# Patient Record
Sex: MALE | Race: BLACK OR AFRICAN AMERICAN | NOT HISPANIC OR LATINO | URBAN - METROPOLITAN AREA
[De-identification: names, ages, dates, MRNs, and addresses within clinical notes are randomized per-mention and may not be internally consistent; named-entity substitution may affect disease eponyms.]

---

## 2024-03-03 PROBLEM — I10 HYPERTENSION: Status: ACTIVE | Noted: 2024-03-03

## 2024-03-03 PROBLEM — I48.91 A-FIB (MULTI): Status: ACTIVE | Noted: 2024-03-03

## 2024-03-03 PROBLEM — I48.92 ATRIAL FLUTTER (MULTI): Status: ACTIVE | Noted: 2024-03-03

## 2024-03-04 ENCOUNTER — HOSPITAL ENCOUNTER (OUTPATIENT)
Dept: CARDIOLOGY | Facility: CLINIC | Age: 34
Discharge: HOME | End: 2024-03-04
Payer: COMMERCIAL

## 2024-03-04 ENCOUNTER — OFFICE VISIT (OUTPATIENT)
Dept: CARDIOLOGY | Facility: CLINIC | Age: 34
End: 2024-03-04
Payer: COMMERCIAL

## 2024-03-04 VITALS
BODY MASS INDEX: 31.25 KG/M2 | OXYGEN SATURATION: 98 % | HEIGHT: 75 IN | SYSTOLIC BLOOD PRESSURE: 113 MMHG | HEART RATE: 83 BPM | DIASTOLIC BLOOD PRESSURE: 77 MMHG | WEIGHT: 251.3 LBS

## 2024-03-04 DIAGNOSIS — R00.2 PALPITATIONS: ICD-10-CM

## 2024-03-04 DIAGNOSIS — I48.92 ATRIAL FLUTTER, UNSPECIFIED TYPE (MULTI): ICD-10-CM

## 2024-03-04 DIAGNOSIS — I51.9 DECREASED LEFT VENTRICULAR SYSTOLIC FUNCTION: ICD-10-CM

## 2024-03-04 PROCEDURE — 93005 ELECTROCARDIOGRAM TRACING: CPT | Mod: 59 | Performed by: INTERNAL MEDICINE

## 2024-03-04 PROCEDURE — 3074F SYST BP LT 130 MM HG: CPT | Performed by: INTERNAL MEDICINE

## 2024-03-04 PROCEDURE — 1036F TOBACCO NON-USER: CPT | Performed by: INTERNAL MEDICINE

## 2024-03-04 PROCEDURE — 99214 OFFICE O/P EST MOD 30 MIN: CPT | Performed by: INTERNAL MEDICINE

## 2024-03-04 PROCEDURE — 93246 EXT ECG>7D<15D RECORDING: CPT

## 2024-03-04 PROCEDURE — 93248 EXT ECG>7D<15D REV&INTERPJ: CPT | Performed by: INTERNAL MEDICINE

## 2024-03-04 PROCEDURE — 3078F DIAST BP <80 MM HG: CPT | Performed by: INTERNAL MEDICINE

## 2024-03-04 PROCEDURE — 93010 ELECTROCARDIOGRAM REPORT: CPT | Performed by: INTERNAL MEDICINE

## 2024-03-04 PROCEDURE — 99204 OFFICE O/P NEW MOD 45 MIN: CPT | Performed by: INTERNAL MEDICINE

## 2024-03-04 ASSESSMENT — ENCOUNTER SYMPTOMS
DYSPNEA ON EXERTION: 1
DEPRESSION: 0
SHORTNESS OF BREATH: 1
ENDOCRINE NEGATIVE: 1
MUSCULOSKELETAL NEGATIVE: 1
PSYCHIATRIC NEGATIVE: 1
LOSS OF SENSATION IN FEET: 0
BLOATING: 1
NEUROLOGICAL NEGATIVE: 1
PALPITATIONS: 1
CONSTITUTIONAL NEGATIVE: 1
HEMATOLOGIC/LYMPHATIC NEGATIVE: 1
OCCASIONAL FEELINGS OF UNSTEADINESS: 1
IRREGULAR HEARTBEAT: 1
EYES NEGATIVE: 1

## 2024-03-04 ASSESSMENT — COLUMBIA-SUICIDE SEVERITY RATING SCALE - C-SSRS
1. IN THE PAST MONTH, HAVE YOU WISHED YOU WERE DEAD OR WISHED YOU COULD GO TO SLEEP AND NOT WAKE UP?: NO
6. HAVE YOU EVER DONE ANYTHING, STARTED TO DO ANYTHING, OR PREPARED TO DO ANYTHING TO END YOUR LIFE?: NO
2. HAVE YOU ACTUALLY HAD ANY THOUGHTS OF KILLING YOURSELF?: NO

## 2024-03-04 ASSESSMENT — PATIENT HEALTH QUESTIONNAIRE - PHQ9
2. FEELING DOWN, DEPRESSED OR HOPELESS: NOT AT ALL
2. FEELING DOWN, DEPRESSED OR HOPELESS: NOT AT ALL
1. LITTLE INTEREST OR PLEASURE IN DOING THINGS: NOT AT ALL
SUM OF ALL RESPONSES TO PHQ9 QUESTIONS 1 AND 2: 0
1. LITTLE INTEREST OR PLEASURE IN DOING THINGS: NOT AT ALL
SUM OF ALL RESPONSES TO PHQ9 QUESTIONS 1 AND 2: 0

## 2024-03-04 ASSESSMENT — PAIN SCALES - GENERAL: PAINLEVEL: 0-NO PAIN

## 2024-03-04 NOTE — PROGRESS NOTES
No current outpatient medications     Subjective   Gibran Mckoy is a 34 y.o. male.    Chief Complaint:  Atrial Fibrillation and Atrial Flutter    HPI    Gibran presents for evaluation of A-fib and AFL.  I last saw him in 2014 during ablation.      PMH includes HTN, tachypalpitations, iRBBB, ine Atrial Flutter with vairble A-V block.    AF/AFL treatment hx:  (Sept 2009) Pt treated in [facility] ED, initially AF given Cardizem, then revealed EKG -AFL with 4:1 block, mult PVCs, or aberant beats.  Treated again with IV Cardizem and oral ASA.  (It was noted pt stated he had an episode in the past after basketball game, but self-resolved) Flecainide tried without success.  DCCV x4 (1 at [place], 9/29/09, 10/11/10 and 12/29/10) @ [facility].  Recurrent AFL with decreased LVEF 35%   - s/p successful AFL RF and cooled tip ablation (2/1/11 @ [facility]) with Dr. Nixon.       Post ablation improved LVEF 40-45% (Nov 2011).  S/p DCCV (2/16/12), s/p DCCV  X2 (2/15/12, 9/18 & 9/22/13) for symptomatic AF.  S/p DCCV (9/3/14).  maintained on flecainide and Diltiazem and warfarin.      -s/p AF ablation (12/30/14) done by me.   During ablation , the flutter changed to atrial fibrillation. Pt converted to NSR at end of PVI.      He had a longer episode of palpitation approx 2 weeks ago and he has been noticing frequent short runs since associated with SOB/LIVINGSTON.      TESTING    -CHAIM:  (9/11/13 pre-CVN) LVEF 35 +/- 5%.  RV dilated.  No RENNY thrombus.  LA and RA dilated.        Review of Systems   Constitutional: Negative.   HENT: Negative.     Eyes: Negative.    Cardiovascular:  Positive for dyspnea on exertion, irregular heartbeat and palpitations.   Respiratory:  Positive for shortness of breath.    Endocrine: Negative.    Hematologic/Lymphatic: Negative.    Skin: Negative.    Musculoskeletal: Negative.    Gastrointestinal:  Positive for bloating.   Genitourinary: Negative.    Neurological: Negative.    Psychiatric/Behavioral: Negative.          Objective   Constitutional:       Appearance: Healthy appearance. Not in distress.   Eyes:      Pupils: Pupils are equal, round, and reactive to light.   Neck:      Thyroid: Thyroid normal.      Vascular: JVD normal.   Pulmonary:      Effort: Pulmonary effort is normal.      Breath sounds: Normal breath sounds.   Cardiovascular:      Normal rate. Regular rhythm. S1 with normal intensity. S2 with normal intensity.       Murmurs: There is no murmur.      No gallop.  No click. No rub.   Edema:     Peripheral edema absent.   Musculoskeletal: Normal range of motion.      Cervical back: Normal range of motion and neck supple. Skin:     General: Skin is warm and moist.   Neurological:      General: No focal deficit present.      Mental Status: Alert and oriented to person, place and time.      Motor: Motor function is intact.      Gait: Gait is intact.       Assessment/Plan   PRESENTS FOR EVALUATION OF TACHY PALPITATIONS FOR 2 WEEKS   PMHx OF AFL S/P ABLATION 2011 @ [facility]; AFIB S/P ABLATION 2014 BY ME; PVCs   NON ISCHEMIC CM WITH LVEF 35% PRIOR TO HIS AF ABLATION - NO F/U VISITS    WE WILL OBTAIN AN ECHOCARDIOGRAM AND A 14-DAY EVENT MONITOR  F/U WITH ANAMARIA LONG IN 4 WEEKS FOR REASSESSMENT.      MD David Phillips Master Clinician of Cardiovascular [place].   [facility].   Director of Electrophysiology Center  Professor of Medicine.   [employer].

## 2024-03-08 LAB
ATRIAL RATE: 83 BPM
P AXIS: 29 DEGREES
P OFFSET: 192 MS
P ONSET: 139 MS
PR INTERVAL: 172 MS
Q ONSET: 225 MS
QRS COUNT: 14 BEATS
QRS DURATION: 92 MS
QT INTERVAL: 400 MS
QTC CALCULATION(BAZETT): 470 MS
QTC FREDERICIA: 446 MS
R AXIS: -18 DEGREES
T AXIS: -6 DEGREES
T OFFSET: 425 MS
VENTRICULAR RATE: 83 BPM

## 2024-03-11 ENCOUNTER — HOSPITAL ENCOUNTER (OUTPATIENT)
Dept: CARDIOLOGY | Facility: CLINIC | Age: 34
Discharge: HOME | End: 2024-03-11
Payer: COMMERCIAL

## 2024-03-11 DIAGNOSIS — I51.9 DECREASED LEFT VENTRICULAR SYSTOLIC FUNCTION: ICD-10-CM

## 2024-03-11 DIAGNOSIS — I48.92 ATRIAL FLUTTER, UNSPECIFIED TYPE (MULTI): Primary | ICD-10-CM

## 2024-03-11 DIAGNOSIS — I48.92 ATRIAL FLUTTER, UNSPECIFIED TYPE (MULTI): ICD-10-CM

## 2024-03-11 LAB
AORTIC VALVE PEAK VELOCITY: 0.97 M/S
AV PEAK GRADIENT: 3.8 MMHG
AVA (PEAK VEL): 4.19 CM2
EJECTION FRACTION APICAL 4 CHAMBER: 52
EJECTION FRACTION: 55 %
LEFT ATRIUM VOLUME AREA LENGTH INDEX BSA: 20.9 ML/M2
LEFT VENTRICLE INTERNAL DIMENSION DIASTOLE: 5.37 CM (ref 3.5–6)
LEFT VENTRICULAR OUTFLOW TRACT DIAMETER: 2.54 CM
MITRAL VALVE E/A RATIO: 1.42
MITRAL VALVE E/E' RATIO: 4.88
RIGHT VENTRICLE FREE WALL PEAK S': 11 CM/S
TRICUSPID ANNULAR PLANE SYSTOLIC EXCURSION: 2.5 CM

## 2024-03-11 PROCEDURE — 2500000004 HC RX 250 GENERAL PHARMACY W/ HCPCS (ALT 636 FOR OP/ED): Performed by: INTERNAL MEDICINE

## 2024-03-11 PROCEDURE — 93306 TTE W/DOPPLER COMPLETE: CPT

## 2024-03-11 PROCEDURE — 93306 TTE W/DOPPLER COMPLETE: CPT | Performed by: INTERNAL MEDICINE

## 2024-03-11 RX ADMIN — PERFLUTREN 4 ML OF DILUTION: 6.52 INJECTION, SUSPENSION INTRAVENOUS at 14:43

## 2024-03-29 ENCOUNTER — OFFICE VISIT (OUTPATIENT)
Dept: CARDIOLOGY | Facility: CLINIC | Age: 34
End: 2024-03-29
Payer: COMMERCIAL

## 2024-03-29 VITALS
WEIGHT: 244.13 LBS | OXYGEN SATURATION: 97 % | DIASTOLIC BLOOD PRESSURE: 77 MMHG | HEIGHT: 75 IN | BODY MASS INDEX: 30.36 KG/M2 | SYSTOLIC BLOOD PRESSURE: 118 MMHG | HEART RATE: 85 BPM

## 2024-03-29 DIAGNOSIS — R06.09 DOE (DYSPNEA ON EXERTION): ICD-10-CM

## 2024-03-29 DIAGNOSIS — I48.91 ATRIAL FIBRILLATION, UNSPECIFIED TYPE (MULTI): Primary | ICD-10-CM

## 2024-03-29 DIAGNOSIS — R94.31 ABNORMAL EKG: ICD-10-CM

## 2024-03-29 LAB
ATRIAL RATE: 84 BPM
BODY SURFACE AREA: 2.46 M2
P AXIS: 37 DEGREES
P OFFSET: 191 MS
P ONSET: 145 MS
PR INTERVAL: 162 MS
Q ONSET: 226 MS
QRS COUNT: 13 BEATS
QRS DURATION: 88 MS
QT INTERVAL: 396 MS
QTC CALCULATION(BAZETT): 467 MS
QTC FREDERICIA: 442 MS
R AXIS: 10 DEGREES
T AXIS: 12 DEGREES
T OFFSET: 424 MS
VENTRICULAR RATE: 84 BPM

## 2024-03-29 PROCEDURE — 99214 OFFICE O/P EST MOD 30 MIN: CPT | Performed by: NURSE PRACTITIONER

## 2024-03-29 PROCEDURE — 3078F DIAST BP <80 MM HG: CPT | Performed by: NURSE PRACTITIONER

## 2024-03-29 PROCEDURE — 1036F TOBACCO NON-USER: CPT | Performed by: NURSE PRACTITIONER

## 2024-03-29 PROCEDURE — 93005 ELECTROCARDIOGRAM TRACING: CPT | Performed by: NURSE PRACTITIONER

## 2024-03-29 PROCEDURE — 93010 ELECTROCARDIOGRAM REPORT: CPT | Performed by: INTERNAL MEDICINE

## 2024-03-29 PROCEDURE — 3074F SYST BP LT 130 MM HG: CPT | Performed by: NURSE PRACTITIONER

## 2024-03-29 ASSESSMENT — ENCOUNTER SYMPTOMS
WEAKNESS: 0
HEMOPTYSIS: 0
SHORTNESS OF BREATH: 0
DYSPNEA ON EXERTION: 0
ABDOMINAL PAIN: 0
FALLS: 0
DOUBLE VISION: 0
SNORING: 0
LIGHT-HEADEDNESS: 0
DIAPHORESIS: 0
SYNCOPE: 0
SPUTUM PRODUCTION: 0
ORTHOPNEA: 0
VOMITING: 0
MYALGIAS: 0
LOSS OF SENSATION IN FEET: 0
FEVER: 0
IRREGULAR HEARTBEAT: 1
OCCASIONAL FEELINGS OF UNSTEADINESS: 0
NEAR-SYNCOPE: 0
DIARRHEA: 0
COUGH: 0
SORE THROAT: 0
PALPITATIONS: 1
NAUSEA: 0
PND: 0
DIZZINESS: 1
BLURRED VISION: 0
DEPRESSION: 0
HEADACHES: 0

## 2024-03-29 ASSESSMENT — COLUMBIA-SUICIDE SEVERITY RATING SCALE - C-SSRS
6. HAVE YOU EVER DONE ANYTHING, STARTED TO DO ANYTHING, OR PREPARED TO DO ANYTHING TO END YOUR LIFE?: NO
2. HAVE YOU ACTUALLY HAD ANY THOUGHTS OF KILLING YOURSELF?: NO
1. IN THE PAST MONTH, HAVE YOU WISHED YOU WERE DEAD OR WISHED YOU COULD GO TO SLEEP AND NOT WAKE UP?: NO

## 2024-03-29 ASSESSMENT — PATIENT HEALTH QUESTIONNAIRE - PHQ9
SUM OF ALL RESPONSES TO PHQ9 QUESTIONS 1 AND 2: 0
2. FEELING DOWN, DEPRESSED OR HOPELESS: NOT AT ALL
1. LITTLE INTEREST OR PLEASURE IN DOING THINGS: NOT AT ALL

## 2024-03-29 ASSESSMENT — PAIN SCALES - GENERAL: PAINLEVEL: 0-NO PAIN

## 2024-03-29 NOTE — PROGRESS NOTES
Subjective   Gibran Mckoy is a 34 y.o. male.    Chief Complaint:  Follow-up (Monitor results,) and Atrial Fibrillation    Gibran presents for follow up of A-fib and AFL and to review recent test results.     PMH includes HTN, tachypalpitations, iRBBB, ine Atrial Flutter with vairble A-V block.   AF/AFL treatment hx:  (Sept 2009) Pt treated in [facility] ED, initially AF given Cardizem, then revealed EKG -AFL with 4:1 block, mult PVCs, or aberant beats.  Treated again with IV Cardizem and oral ASA.  (It was noted pt stated he had an episode in the past after basketball game, but self-resolved) Flecainide tried without success.  DCCV x4 (1 at [place], 9/29/09, 10/11/10 and 12/29/10) @ [facility].  Recurrent AFL with decreased LVEF 35%   - s/p successful AFL RF and cooled tip ablation (2/1/11 @ [facility]) with Dr. Nixon.        Post ablation improved LVEF 40-45% (Nov 2011).  S/p DCCV (2/16/12), s/p DCCV  X2 (2/15/12, 9/18 & 9/22/13) for symptomatic AF.  S/p DCCV (9/3/14).  maintained on flecainide and Diltiazem and warfarin.    -s/p AF ablation (12/30/14) done by Dr. Luque.   During ablation , the flutter changed to atrial fibrillation. Pt converted to NSR at end of PVI.       TESTING  -CHAIM:  (9/11/13 pre-CVN) LVEF 35 +/- 5%.  RV dilated.  No RENNY thrombus.  LA and RA dilated.    -Transthoracic Echo 3/11/2024   1. Left ventricular systolic function is low normal with a 50% estimated ejection fraction.  2. No significant valvular pathology.  3. No prior echocardiogram available for comparison.   - Holter monitor 3/4-3/18/2024: Predominant rhythm is NSR. Max  bpm, min HR 61 bpm, avg HR 83 bpm, <1% VE burden, <1% SVE burden, 2 occurrences of VT, the longest 7 beats, fastest 134 bpm.  Patient triggers associated with ST, SVE, and VE  ECG 3/29/2024 NSR HR 84 bpm, non specific T wave abnormality.    TODAY Patient presents for follow up of test results.  He wore the monitor without any issues.  He was not exercising during these 2  "weeks and admits when he works out, his heart races and he is near syncopal. He admits to elevated heart rates or hard heart beats with associated fatigue that can last all day.    /77 (BP Location: Left arm, Patient Position: Sitting, BP Cuff Size: Large adult)   Pulse 85   Ht 1.905 m (6' 3\")   Wt 111 kg (244 lb 2 oz)   SpO2 97%   BMI 30.51 kg/m²   No current outpatient medications on file prior to visit.     No current facility-administered medications on file prior to visit.         Review of Systems   Constitutional: Positive for malaise/fatigue. Negative for diaphoresis and fever.   HENT:  Negative for congestion and sore throat.    Eyes:  Negative for blurred vision and double vision.   Cardiovascular:  Positive for irregular heartbeat and palpitations. Negative for chest pain, dyspnea on exertion, leg swelling, near-syncope, orthopnea, paroxysmal nocturnal dyspnea and syncope.   Respiratory:  Negative for cough, hemoptysis, shortness of breath, snoring and sputum production.    Hematologic/Lymphatic: Negative for bleeding problem.   Skin:  Negative for rash.   Musculoskeletal:  Negative for falls, joint pain and myalgias.   Gastrointestinal:  Negative for abdominal pain, diarrhea, nausea and vomiting.   Neurological:  Positive for dizziness. Negative for headaches, light-headedness and weakness.   All other systems reviewed and are negative.      Objective   Constitutional:       Appearance: Healthy appearance. Not in distress.   Eyes:      Conjunctiva/sclera: Conjunctivae normal.   HENT:      Nose: Nose normal.    Mouth/Throat:      Pharynx: Oropharynx is clear.   Pulmonary:      Effort: Pulmonary effort is normal.      Breath sounds: Normal breath sounds. No wheezing. No rhonchi.   Chest:      Chest wall: Not tender to palpatation.   Cardiovascular:      Normal rate. Regular rhythm.      Murmurs: There is no murmur.      No rub.   Pulses:     Intact distal pulses.   Edema:     Peripheral edema " absent.   Abdominal:      General: Bowel sounds are normal.      Palpations: Abdomen is soft.   Musculoskeletal: Normal range of motion.      Cervical back: Neck supple. Skin:     General: Skin is warm and dry.   Neurological:      Mental Status: Alert and oriented to person, place and time.      Motor: Motor function is intact.     Patient had normal CBC,TSH,CMP, Mag, and Hemoglobin A1c drawn 3/21/24 @ [facility]    Assessment/Plan   The primary encounter diagnosis was Atrial fibrillation, unspecified type (CMS/HCC). Diagnoses of Abnormal EKG and LIVINGSTON (dyspnea on exertion) were also pertinent to this visit.  Patient presents today for follow-up of his cardiac testing and symptoms of palpitations and fatigue.  I reviewed his preliminary Holter monitor results (physician has not confirmed them at this point) and echo results with him. The 2 episodes of VT look like short bursts of SVT to me. His symptoms are not related with any arrhythmias except sinus tachycardia and the occasional PAC/PVC.  He was not active during this time and tends to have more symptoms when he is exercising.  His echo in the past has shown cardiomyopathy when he is having episodes of A-fib or atrial flutter, and his EF is low normal on the most recent test.    His baseline ECG does show some nonspecific T wave abnormalities as well as a slightly prolonged QT interval.  Since most of his symptoms are worse with exercise, I believe it is reasonable to get an exercise stress echo.  He has had cardiomyopathy in the past with his arrhythmias and had really like to see if his heart function is affected with activity as well as any ECG changes.    I will review the monitor results with Dr. Luque, and I recommend exercise stress echo  I will call the patient with any updates and test results

## 2024-04-03 ENCOUNTER — APPOINTMENT (OUTPATIENT)
Dept: CARDIOLOGY | Facility: CLINIC | Age: 34
End: 2024-04-03
Payer: COMMERCIAL

## 2024-04-16 ENCOUNTER — HOSPITAL ENCOUNTER (OUTPATIENT)
Dept: CARDIOLOGY | Facility: CLINIC | Age: 34
Discharge: HOME | End: 2024-04-16
Payer: COMMERCIAL

## 2024-04-16 DIAGNOSIS — R06.00 DYSPNEA, UNSPECIFIED: ICD-10-CM

## 2024-04-16 DIAGNOSIS — R06.09 DOE (DYSPNEA ON EXERTION): ICD-10-CM

## 2024-04-16 DIAGNOSIS — R94.31 ABNORMAL EKG: ICD-10-CM

## 2024-04-16 PROCEDURE — 93321 DOPPLER ECHO F-UP/LMTD STD: CPT

## 2024-04-25 ENCOUNTER — TELEPHONE (OUTPATIENT)
Dept: CARDIOLOGY | Facility: CLINIC | Age: 34
End: 2024-04-25
Payer: COMMERCIAL

## 2024-04-25 DIAGNOSIS — R06.09 DOE (DYSPNEA ON EXERTION): Primary | ICD-10-CM

## 2024-04-25 NOTE — TELEPHONE ENCOUNTER
2nd attempt to call to review stress test results and got voicemail.  Message left for the patient.  Stress test did not show any abnormalities that would explain is dyspnea on exertion.  We will refer him to the dyspnea clinic, appointment number left of the voicemail as well as a call back if he has any questions

## 2024-06-05 PROBLEM — R94.31 ABNORMAL ELECTROCARDIOGRAPHY: Status: ACTIVE | Noted: 2024-06-05

## 2024-06-05 PROBLEM — R06.09 DYSPNEA ON EXERTION: Status: ACTIVE | Noted: 2024-06-05

## 2024-06-06 ENCOUNTER — APPOINTMENT (OUTPATIENT)
Dept: CARDIOLOGY | Facility: CLINIC | Age: 34
End: 2024-06-06
Payer: COMMERCIAL